# Patient Record
Sex: FEMALE | Race: BLACK OR AFRICAN AMERICAN | NOT HISPANIC OR LATINO | Employment: FULL TIME | URBAN - METROPOLITAN AREA
[De-identification: names, ages, dates, MRNs, and addresses within clinical notes are randomized per-mention and may not be internally consistent; named-entity substitution may affect disease eponyms.]

---

## 2017-05-26 ENCOUNTER — HOSPITAL ENCOUNTER (EMERGENCY)
Facility: HOSPITAL | Age: 32
Discharge: HOME | End: 2017-05-27
Payer: MEDICAID

## 2017-05-26 DIAGNOSIS — T19.2XXA VAGINAL FOREIGN BODY, INITIAL ENCOUNTER: Primary | ICD-10-CM

## 2017-05-26 LAB
ALBUMIN SERPL BCP-MCNC: 3.9 G/DL
ALP SERPL-CCNC: 51 U/L
ALT SERPL W/O P-5'-P-CCNC: 36 U/L
ANION GAP SERPL CALC-SCNC: 5 MMOL/L
AST SERPL-CCNC: 46 U/L
B-HCG UR QL: NEGATIVE
BASOPHILS # BLD AUTO: 0.04 K/UL
BASOPHILS NFR BLD: 0.6 %
BILIRUB SERPL-MCNC: 0.3 MG/DL
BILIRUB UR QL STRIP: NEGATIVE
BUN SERPL-MCNC: 11 MG/DL
CALCIUM SERPL-MCNC: 8.9 MG/DL
CHLORIDE SERPL-SCNC: 109 MMOL/L
CLARITY UR: CLEAR
CO2 SERPL-SCNC: 25 MMOL/L
COLOR UR: YELLOW
CREAT SERPL-MCNC: 0.9 MG/DL
DIFFERENTIAL METHOD: NORMAL
EOSINOPHIL # BLD AUTO: 0.2 K/UL
EOSINOPHIL NFR BLD: 2.4 %
ERYTHROCYTE [DISTWIDTH] IN BLOOD BY AUTOMATED COUNT: 12.7 %
EST. GFR  (AFRICAN AMERICAN): >60 ML/MIN/1.73 M^2
EST. GFR  (NON AFRICAN AMERICAN): >60 ML/MIN/1.73 M^2
GLUCOSE SERPL-MCNC: 82 MG/DL
GLUCOSE UR QL STRIP: NEGATIVE
HCT VFR BLD AUTO: 37.1 %
HGB BLD-MCNC: 12.9 G/DL
HGB UR QL STRIP: NEGATIVE
KETONES UR QL STRIP: NEGATIVE
LEUKOCYTE ESTERASE UR QL STRIP: NEGATIVE
LYMPHOCYTES # BLD AUTO: 2.9 K/UL
LYMPHOCYTES NFR BLD: 43.1 %
MCH RBC QN AUTO: 30 PG
MCHC RBC AUTO-ENTMCNC: 34.8 %
MCV RBC AUTO: 86 FL
MONOCYTES # BLD AUTO: 0.7 K/UL
MONOCYTES NFR BLD: 9.7 %
NEUTROPHILS # BLD AUTO: 3 K/UL
NEUTROPHILS NFR BLD: 44.2 %
NITRITE UR QL STRIP: NEGATIVE
PH UR STRIP: 6 [PH] (ref 5–8)
PLATELET # BLD AUTO: 259 K/UL
PMV BLD AUTO: 9.3 FL
POTASSIUM SERPL-SCNC: 3.6 MMOL/L
PROT SERPL-MCNC: 6.8 G/DL
PROT UR QL STRIP: NEGATIVE
RBC # BLD AUTO: 4.3 M/UL
SODIUM SERPL-SCNC: 139 MMOL/L
SP GR UR STRIP: 1.01 (ref 1–1.03)
URN SPEC COLLECT METH UR: NORMAL
UROBILINOGEN UR STRIP-ACNC: NEGATIVE EU/DL
WBC # BLD AUTO: 6.71 K/UL

## 2017-05-26 PROCEDURE — 81003 URINALYSIS AUTO W/O SCOPE: CPT

## 2017-05-26 PROCEDURE — 85025 COMPLETE CBC W/AUTO DIFF WBC: CPT

## 2017-05-26 PROCEDURE — 80053 COMPREHEN METABOLIC PANEL: CPT

## 2017-05-26 PROCEDURE — 99284 EMERGENCY DEPT VISIT MOD MDM: CPT | Mod: 27

## 2017-05-26 PROCEDURE — 36415 COLL VENOUS BLD VENIPUNCTURE: CPT

## 2017-05-26 PROCEDURE — 81025 URINE PREGNANCY TEST: CPT

## 2017-05-27 VITALS
TEMPERATURE: 98 F | BODY MASS INDEX: 32.44 KG/M2 | WEIGHT: 190 LBS | OXYGEN SATURATION: 100 % | SYSTOLIC BLOOD PRESSURE: 134 MMHG | HEART RATE: 62 BPM | HEIGHT: 64 IN | DIASTOLIC BLOOD PRESSURE: 78 MMHG | RESPIRATION RATE: 20 BRPM

## 2017-05-27 NOTE — ED NOTES
"Pt reports hx of abnormal vaginal bleeding.  Pt states +irregular periods.  States bleeding began Tuesday while pt was at work.  Pt reports 1 tampon per hour with quarter sized blood clots. Denies chest pain, weakness, sob, dizziness .  Pt also reports 'white, thick vaginal d/c x few weeks".  Denies vaginal itching.   "

## 2017-05-27 NOTE — ED PROVIDER NOTES
SCRIBE #1 NOTE: I, Yves Kumar, am scribing for, and in the presence of, Krishan Florence NP. I have scribed the entire note.      History      Chief Complaint   Patient presents with    Vaginal Bleeding     vaginal bleeding for a few days with abdominal pain       Review of patient's allergies indicates:  No Known Allergies     HPI   HPI    5/26/2017, 10:41 PM   History obtained from the patient      History of Present Illness: Harleen Hernandez is a 31 y.o. female patient who presents to the Emergency Department for vaginal bleeding, ongoing for 4 months. Pt reports she had initially presented to OB-GYN in February of this year who stated that '[she] was bleeding from her tubes'. Pt does have a SHx of Tubal Ligation. The bleeding is intermittent and moderate in severity. No modifying factors reported. Pt states her last menstrual cycle was two months ago. Pt also c/o lower abdominal pain, defined as cramping. Patient denies any headache, dizziness, light-headedness, CP, SOB, N/V/D or any other sx at this time. No further complaints or concerns at this time.     Arrival mode: Personal vehicle    PCP: Jagdish Ace MD       Past Medical History:  History reviewed. No pertinent past medical history.    Past Surgical History:  History reviewed. No pertinent surgical history.      Family History:  History reviewed. No pertinent family history.    Social History:  Social History     Social History Main Topics    Smoking status: Not on file    Smokeless tobacco: Not on file    Alcohol use Not on file    Drug use: Unknown    Sexual activity: Not on file       ROS   Review of Systems   Constitutional: Negative for chills and fever.   HENT: Negative for sore throat.    Respiratory: Negative for shortness of breath.    Cardiovascular: Negative for chest pain.   Gastrointestinal: Positive for abdominal pain. Negative for diarrhea, nausea and vomiting.   Genitourinary: Positive for vaginal bleeding. Negative for  dysuria.   Musculoskeletal: Negative for back pain.   Skin: Negative for rash.   Neurological: Negative for dizziness, weakness, light-headedness and headaches.   Hematological: Does not bruise/bleed easily.       Physical Exam      Initial Vitals [05/26/17 2202]   BP Pulse Resp Temp SpO2   (!) 140/81 60 18 97.4 °F (36.3 °C) 99 %      Physical Exam   Genitourinary: Cervix exhibits no motion tenderness, no discharge and no friability. Right adnexum displays no tenderness. Left adnexum displays no tenderness. No bleeding in the vagina.   There is a foreign body in the vagina.   Genitourinary Comments: Upon vaginal exam retained tampon was found and removed. Patient tolerated exam well. Chaperone was present for exam.      Nursing Notes and Vital Signs Reviewed.  Constitutional: Patient is in no acute distress. Well-developed and well-nourished.  Head: Atraumatic. Normocephalic.  Eyes: PERRL. EOM intact. Conjunctivae are not pale. No scleral icterus.  ENT: Mucous membranes are moist. Oropharynx is clear and symmetric.    Neck: Supple. Full ROM. No lymphadenopathy.  Cardiovascular: Regular rate. Regular rhythm. No murmurs, rubs, or gallops. Distal pulses are 2+ and symmetric.  Pulmonary/Chest: No respiratory distress. Clear to auscultation bilaterally. No wheezing, rales, or rhonchi.  Abdominal: Soft and non-distended.  There is no tenderness.  No rebound, guarding, or rigidity. Good bowel sounds.  Genitourinary: No CVA tenderness  Musculoskeletal: Moves all extremities. No obvious deformities. No edema. No calf tenderness.  Skin: Warm and dry.  Neurological:  Alert, awake, and appropriate.  Normal speech.  No acute focal neurological deficits are appreciated.  Psychiatric: Normal affect. Good eye contact. Appropriate in content.    ED Course    Procedures  ED Vital Signs:  Vitals:    05/26/17 2202   BP: (!) 140/81   Pulse: 60   Resp: 18   Temp: 97.4 °F (36.3 °C)   TempSrc: Oral   SpO2: 99%   Weight: 86.2 kg (190 lb)  "  Height: 5' 4" (1.626 m)       Abnormal Lab Results:  Labs Reviewed   COMPREHENSIVE METABOLIC PANEL - Abnormal; Notable for the following:        Result Value    Alkaline Phosphatase 51 (*)     AST 46 (*)     Anion Gap 5 (*)     All other components within normal limits   CBC W/ AUTO DIFFERENTIAL   PREGNANCY TEST, URINE RAPID   URINALYSIS        All Lab Results:  Results for orders placed or performed during the hospital encounter of 05/26/17   CBC W/ AUTO DIFFERENTIAL   Result Value Ref Range    WBC 6.71 3.90 - 12.70 K/uL    RBC 4.30 4.00 - 5.40 M/uL    Hemoglobin 12.9 12.0 - 16.0 g/dL    Hematocrit 37.1 37.0 - 48.5 %    MCV 86 82 - 98 fL    MCH 30.0 27.0 - 31.0 pg    MCHC 34.8 32.0 - 36.0 %    RDW 12.7 11.5 - 14.5 %    Platelets 259 150 - 350 K/uL    MPV 9.3 9.2 - 12.9 fL    Gran # 3.0 1.8 - 7.7 K/uL    Lymph # 2.9 1.0 - 4.8 K/uL    Mono # 0.7 0.3 - 1.0 K/uL    Eos # 0.2 0.0 - 0.5 K/uL    Baso # 0.04 0.00 - 0.20 K/uL    Gran% 44.2 38.0 - 73.0 %    Lymph% 43.1 18.0 - 48.0 %    Mono% 9.7 4.0 - 15.0 %    Eosinophil% 2.4 0.0 - 8.0 %    Basophil% 0.6 0.0 - 1.9 %    Differential Method Automated    Comp. Metabolic Panel   Result Value Ref Range    Sodium 139 136 - 145 mmol/L    Potassium 3.6 3.5 - 5.1 mmol/L    Chloride 109 95 - 110 mmol/L    CO2 25 23 - 29 mmol/L    Glucose 82 70 - 110 mg/dL    BUN, Bld 11 6 - 20 mg/dL    Creatinine 0.9 0.5 - 1.4 mg/dL    Calcium 8.9 8.7 - 10.5 mg/dL    Total Protein 6.8 6.0 - 8.4 g/dL    Albumin 3.9 3.5 - 5.2 g/dL    Total Bilirubin 0.3 0.1 - 1.0 mg/dL    Alkaline Phosphatase 51 (L) 55 - 135 U/L    AST 46 (H) 10 - 40 U/L    ALT 36 10 - 44 U/L    Anion Gap 5 (L) 8 - 16 mmol/L    eGFR if African American >60 >60 mL/min/1.73 m^2    eGFR if non African American >60 >60 mL/min/1.73 m^2   Pregnancy, urine rapid   Result Value Ref Range    Preg Test, Ur Negative    Urinalysis   Result Value Ref Range    Specimen UA Urine, Clean Catch     Color, UA Yellow Yellow, Straw, Karlene    Appearance, " UA Clear Clear    pH, UA 6.0 5.0 - 8.0    Specific Gravity, UA 1.015 1.005 - 1.030    Protein, UA Negative Negative    Glucose, UA Negative Negative    Ketones, UA Negative Negative    Bilirubin (UA) Negative Negative    Occult Blood UA Negative Negative    Nitrite, UA Negative Negative    Urobilinogen, UA Negative <2.0 EU/dL    Leukocytes, UA Negative Negative         Imaging Results:  Imaging Results    None                 The Emergency Provider reviewed the vital signs and test results, which are outlined above.    ED Discussion     12:24 AM RE-EVALUATION & DISPOSITION:   Reassessment at the time of disposition demonstrates that the patient is resting comfortably in no acute distress.  She has remained hemodynamically stable throughout the entire ED visit and is without objective evidence for acute process requiring urgent intervention or hospitalization. I discussed test results and provided counseling to patient with regard to condition, the treatment plan, specific conditions for return, and the importance of follow up.  Answered questions at this time. The patient is stable for discharge.     ED Medication(s):  Medications - No data to display    New Prescriptions    No medications on file       Follow-up Information     Jagdish Ace MD. Schedule an appointment as soon as possible for a visit in 2 days.    Specialty:  Internal Medicine  Why:  Return to ER for any new or worse symptoms  Contact information:  [address] [facility]  [facility]  [facility] [address] [phone]                     Medical Decision Making              Scribe Attestation:   Scribe #1: I performed the above scribed service and the documentation accurately describes the services I performed. I attest to the accuracy of the note.    Attending:   Physician Attestation Statement for Scribe #1: I, Krishan Florence NP, personally performed the services described in this documentation, as scribed by Yves Kumar in  my presence, and it is both accurate and complete.          Clinical Impression       ICD-10-CM ICD-9-CM   1. Vaginal foreign body, initial encounterAcute T19.2XXA 939.2     E915       Disposition:   Disposition: Discharged  Condition: Stable         Krishan Florence NP  05/27/17 0024

## 2018-06-17 ENCOUNTER — HOSPITAL ENCOUNTER (EMERGENCY)
Facility: HOSPITAL | Age: 33
Discharge: HOME | End: 2018-06-17
Attending: EMERGENCY MEDICINE
Payer: MEDICAID

## 2018-06-17 VITALS
WEIGHT: 179.88 LBS | TEMPERATURE: 99 F | DIASTOLIC BLOOD PRESSURE: 70 MMHG | OXYGEN SATURATION: 98 % | BODY MASS INDEX: 35.31 KG/M2 | SYSTOLIC BLOOD PRESSURE: 123 MMHG | RESPIRATION RATE: 18 BRPM | HEIGHT: 60 IN | HEART RATE: 101 BPM

## 2018-06-17 DIAGNOSIS — R91.8 PULMONARY INFILTRATE IN RIGHT LUNG ON CHEST X-RAY: ICD-10-CM

## 2018-06-17 DIAGNOSIS — R05.9 COUGH: ICD-10-CM

## 2018-06-17 DIAGNOSIS — J40 BRONCHITIS: ICD-10-CM

## 2018-06-17 DIAGNOSIS — J45.909 MODERATE ASTHMA WITHOUT COMPLICATION, UNSPECIFIED WHETHER PERSISTENT: Primary | ICD-10-CM

## 2018-06-17 LAB
B-HCG UR QL: NEGATIVE
BILIRUB UR QL STRIP: NEGATIVE
CLARITY UR: CLEAR
COLOR UR: YELLOW
GLUCOSE UR QL STRIP: NEGATIVE
HGB UR QL STRIP: NEGATIVE
KETONES UR QL STRIP: NEGATIVE
LEUKOCYTE ESTERASE UR QL STRIP: ABNORMAL
MICROSCOPIC COMMENT: NORMAL
NITRITE UR QL STRIP: NEGATIVE
PH UR STRIP: 6 [PH] (ref 5–8)
PROT UR QL STRIP: NEGATIVE
RBC #/AREA URNS HPF: 1 /HPF (ref 0–4)
SP GR UR STRIP: 1.01 (ref 1–1.03)
SQUAMOUS #/AREA URNS HPF: 3 /HPF
URN SPEC COLLECT METH UR: ABNORMAL
UROBILINOGEN UR STRIP-ACNC: NEGATIVE EU/DL
WBC #/AREA URNS HPF: 5 /HPF (ref 0–5)

## 2018-06-17 PROCEDURE — 81000 URINALYSIS NONAUTO W/SCOPE: CPT

## 2018-06-17 PROCEDURE — 99284 EMERGENCY DEPT VISIT MOD MDM: CPT | Mod: 25

## 2018-06-17 PROCEDURE — 94640 AIRWAY INHALATION TREATMENT: CPT | Mod: 76

## 2018-06-17 PROCEDURE — 81025 URINE PREGNANCY TEST: CPT

## 2018-06-17 PROCEDURE — 25000242 PHARM REV CODE 250 ALT 637 W/ HCPCS: Performed by: EMERGENCY MEDICINE

## 2018-06-17 PROCEDURE — 25000003 PHARM REV CODE 250: Performed by: EMERGENCY MEDICINE

## 2018-06-17 RX ORDER — ONDANSETRON 4 MG/1
4 TABLET, ORALLY DISINTEGRATING ORAL EVERY 8 HOURS PRN
Qty: 15 TABLET | Refills: 0 | Status: SHIPPED | OUTPATIENT
Start: 2018-06-17

## 2018-06-17 RX ORDER — LEVOFLOXACIN 500 MG/1
500 TABLET, FILM COATED ORAL DAILY
Qty: 7 TABLET | Refills: 0 | Status: SHIPPED | OUTPATIENT
Start: 2018-06-17 | End: 2018-06-24

## 2018-06-17 RX ORDER — IPRATROPIUM BROMIDE AND ALBUTEROL SULFATE 2.5; .5 MG/3ML; MG/3ML
3 SOLUTION RESPIRATORY (INHALATION)
Status: COMPLETED | OUTPATIENT
Start: 2018-06-17 | End: 2018-06-17

## 2018-06-17 RX ORDER — ONDANSETRON 4 MG/1
4 TABLET, ORALLY DISINTEGRATING ORAL
Status: COMPLETED | OUTPATIENT
Start: 2018-06-17 | End: 2018-06-17

## 2018-06-17 RX ORDER — PROMETHAZINE HYDROCHLORIDE AND DEXTROMETHORPHAN HYDROBROMIDE 6.25; 15 MG/5ML; MG/5ML
5 SYRUP ORAL EVERY 6 HOURS PRN
Qty: 180 ML | Refills: 0 | Status: SHIPPED | OUTPATIENT
Start: 2018-06-17 | End: 2018-06-27

## 2018-06-17 RX ORDER — ALBUTEROL SULFATE 90 UG/1
2 AEROSOL, METERED RESPIRATORY (INHALATION) EVERY 6 HOURS PRN
Qty: 1 INHALER | Refills: 3 | Status: SHIPPED | OUTPATIENT
Start: 2018-06-17 | End: 2019-06-17

## 2018-06-17 RX ADMIN — IPRATROPIUM BROMIDE AND ALBUTEROL SULFATE 3 ML: .5; 3 SOLUTION RESPIRATORY (INHALATION) at 05:06

## 2018-06-17 RX ADMIN — ONDANSETRON 4 MG: 4 TABLET, ORALLY DISINTEGRATING ORAL at 05:06

## 2018-06-17 NOTE — ED PROVIDER NOTES
"SCRIBE #1 NOTE: I, Mirella Tommykamalen, am scribing for, and in the presence of, Colin Dent Jr., MD. I have scribed the entire note.      History      Chief Complaint   Patient presents with    General Illness     Pt states, "I have a chest cold and I threw up a few times since yesterday."       Review of patient's allergies indicates:  No Known Allergies     HPI   HPI    6/17/2018, 5:28 PM   History obtained from the patient      History of Present Illness: Harleen Deshpande is a 32 y.o. female patient with a hx of asthma who presents to the Emergency Department for cough which onset gradually a few days ago. Symptoms are intermittent and moderate in severity.  No mitigating or exacerbating factors reported. Associated sxs include congestion and n/v. Patient denies any sore throat, fever, chills, ear pain, constipation, hematochezia, dysuria, hematuria, urinary frequency, diarrhea, abd pain, CP, SOB, and all other sxs at this time. No further complaints or concerns at this time.         Arrival mode: Personal vehicle     PCP: Primary Doctor No       Past Medical History:  Past medical history reviewed not relevant      Past Surgical History:  Past surgical history reviewed not relevant      Family History:  Family history reviewed not relevant      Social History:  Social History    Social History Main Topics    Social History Main Topics    Smoking status: Unknown if ever smoked    Smokeless tobacco: Unknown if ever used    Alcohol Use: Unknown drinking history    Drug Use: Unknown if ever used    Sexual Activity: Unknown       ROS   Review of Systems   Constitutional: Negative for chills and fever.   HENT: Positive for congestion. Negative for ear pain and sore throat.    Respiratory: Positive for cough. Negative for shortness of breath.    Cardiovascular: Negative for chest pain.   Gastrointestinal: Positive for nausea and vomiting. Negative for abdominal pain, blood in stool, constipation and diarrhea. "   Genitourinary: Negative for dysuria, frequency and hematuria.   Musculoskeletal: Negative for back pain.   Skin: Negative for rash.   Neurological: Negative for weakness.   Hematological: Does not bruise/bleed easily.   All other systems reviewed and are negative.      Physical Exam      Initial Vitals [06/17/18 1725]   BP Pulse Resp Temp SpO2   123/70 99 20 99.3 °F (37.4 °C) 96 %      MAP       --          Physical Exam  Nursing Notes and Vital Signs Reviewed.  Constitutional: Patient is in no acute distress. Well-developed and well-nourished.  Head: Atraumatic. Normocephalic.  Eyes: PERRL. EOM intact. Conjunctivae are not pale. No scleral icterus.  ENT: Mucous membranes are moist. Oropharynx is clear and symmetric. Postnasal drip  Neck: Supple. Full ROM. No lymphadenopathy.  Cardiovascular: Regular rate. Regular rhythm. No murmurs, rubs, or gallops. Distal pulses are 2+ and symmetric.  Pulmonary/Chest: No respiratory distress. Congested. Wheezing bilaterally. No rales.  Abdominal: Soft and non-distended.  There is no tenderness.  No rebound, guarding, or rigidity.   Musculoskeletal: Moves all extremities. No obvious deformities. No edema.   Skin: Warm and dry.  Neurological:  Alert, awake, and appropriate.  Normal speech.  No acute focal neurological deficits are appreciated.  Psychiatric: Normal affect. Good eye contact. Appropriate in content.    ED Course    Procedures  ED Vital Signs:  Vitals:    06/17/18 1725 06/17/18 1742 06/17/18 1748 06/17/18 1753   BP: 123/70      Pulse: 99 97 95 101   Resp: 20 17 18 18   Temp: 99.3 °F (37.4 °C)      TempSrc: Oral      SpO2: 96% 96% 100% 96%   Weight: 81.6 kg (179 lb 14.3 oz)      Height: 5' (1.524 m)          Abnormal Lab Results:  Labs Reviewed   URINALYSIS - Abnormal; Notable for the following:        Result Value    Leukocytes, UA Trace (*)     All other components within normal limits   PREGNANCY TEST, URINE RAPID   URINALYSIS MICROSCOPIC        All Lab  Results:  Results for orders placed or performed during the hospital encounter of 06/17/18   Urinalysis   Result Value Ref Range    Specimen UA Urine, Clean Catch     Color, UA Yellow Yellow, Straw, Karlene    Appearance, UA Clear Clear    pH, UA 6.0 5.0 - 8.0    Specific Gravity, UA 1.010 1.005 - 1.030    Protein, UA Negative Negative    Glucose, UA Negative Negative    Ketones, UA Negative Negative    Bilirubin (UA) Negative Negative    Occult Blood UA Negative Negative    Nitrite, UA Negative Negative    Urobilinogen, UA Negative <2.0 EU/dL    Leukocytes, UA Trace (A) Negative   Pregnancy, urine rapid (UPT)   Result Value Ref Range    Preg Test, Ur Negative    Urinalysis Microscopic   Result Value Ref Range    RBC, UA 1 0 - 4 /hpf    WBC, UA 5 0 - 5 /hpf    Squam Epithel, UA 3 /hpf    Microscopic Comment SEE COMMENT          Imaging Results:  Imaging Results          X-Ray Chest PA And Lateral (Final result)  Result time 06/17/18 18:15:45    Final result by Doug Smyth MD (06/17/18 18:15:45)                 Impression:      No acute findings.      Electronically signed by: Doug Smyth MD  Date:    06/17/2018  Time:    18:15             Narrative:    EXAMINATION:  XR CHEST PA AND LATERAL    CLINICAL HISTORY:  Cough    COMPARISON:  None    FINDINGS:  Mild atelectasis or infiltrate identified left lung base.  The right lung is clear.  Heart size within normal limits.No significant bony findings.                                        The Emergency Provider reviewed the vital signs and test results, which are outlined above.    ED Discussion       6:22 PM: Reassessed pt at this time. Pt is awake, alert, and in NAD at this time. Pt's lungs are clear after receiving breathing treatments. Discussed with pt all pertinent ED information and results. Discussed pt dx and plan of tx. Gave pt all f/u and return to the ED instructions. All questions and concerns were addressed at this time. Pt expresses  understanding of information and instructions, and is comfortable with plan to discharge. Pt is stable for discharge.      I discussed with patient and/or family/caretaker that evaluation in the ED does not suggest any emergent or life threatening medical conditions requiring immediate intervention beyond what was provided in the ED, and I believe patient is safe for discharge.  Regardless, an unremarkable evaluation in the ED does not preclude the development or presence of a serious of life threatening condition. As such, patient was instructed to return immediately for any worsening or change in current symptoms.      ED Medication(s):  Medications   albuterol-ipratropium 2.5 mg-0.5 mg/3 mL nebulizer solution 3 mL (3 mLs Nebulization Given by Other 6/17/18 6553)   ondansetron disintegrating tablet 4 mg (4 mg Oral Given 6/17/18 1745)       New Prescriptions    ALBUTEROL 90 MCG/ACTUATION INHALER    Inhale 2 puffs into the lungs every 6 (six) hours as needed for Wheezing or Shortness of Breath. Rescue    LEVOFLOXACIN (LEVAQUIN) 500 MG TABLET    Take 1 tablet (500 mg total) by mouth once daily.    ONDANSETRON (ZOFRAN-ODT) 4 MG TBDL    Take 1 tablet (4 mg total) by mouth every 8 (eight) hours as needed (prn nausea/vomiting).    PROMETHAZINE-DEXTROMETHORPHAN (PROMETHAZINE-DM) 6.25-15 MG/5 ML SYRP    Take 5 mLs by mouth every 6 (six) hours as needed. Prn nausea/coughing       Follow-up Information     Call  [facility].    Why:  As needed to schedule appt for recheck this week  Contact information:  [address] [facility] [address] [phone]             [facility]    Specialty:  Emergency Medicine  Why:  As needed  Contact information:  [address] [facility] [address] [phone]                   Medical Decision Making    Medical Decision Making:   Clinical Tests:   Lab Tests: Ordered and Reviewed  Radiological Study: Ordered and Reviewed            Scribe Attestation:   Scribe #1: I performed the above scribed service and the documentation accurately describes the services I performed. I attest to the accuracy of the note.    Attending:   Physician Attestation Statement for Scribe #1: I, Colin Detn Jr., MD, personally performed the services described in this documentation, as scribed by Mirella Trevino, in my presence, and it is both accurate and complete.          Clinical Impression       ICD-10-CM ICD-9-CM   1. Moderate asthma without complication, unspecified whether persistent J45.909 493.90   2. Cough R05 786.2   3. Bronchitis J40 490   4. Pulmonary infiltrate in right lung on chest x-ray R91.8 793.19       Disposition:   Disposition: Discharged  Condition: Stable         Colin Dent Jr., MD  06/17/18 7859

## 2018-06-19 ENCOUNTER — HOSPITAL ENCOUNTER (EMERGENCY)
Facility: HOSPITAL | Age: 33
Discharge: HOME | End: 2018-06-19
Payer: MEDICAID

## 2018-06-19 VITALS
RESPIRATION RATE: 20 BRPM | SYSTOLIC BLOOD PRESSURE: 120 MMHG | HEIGHT: 60 IN | TEMPERATURE: 97 F | DIASTOLIC BLOOD PRESSURE: 64 MMHG | HEART RATE: 78 BPM | OXYGEN SATURATION: 97 % | BODY MASS INDEX: 35.53 KG/M2 | WEIGHT: 181 LBS

## 2018-06-19 DIAGNOSIS — R53.83 FATIGUE, UNSPECIFIED TYPE: ICD-10-CM

## 2018-06-19 DIAGNOSIS — R06.2 WHEEZING: Primary | ICD-10-CM

## 2018-06-19 DIAGNOSIS — R06.02 SHORTNESS OF BREATH: ICD-10-CM

## 2018-06-19 PROCEDURE — 93010 ELECTROCARDIOGRAM REPORT: CPT | Mod: ,,, | Performed by: INTERNAL MEDICINE

## 2018-06-19 PROCEDURE — 93005 ELECTROCARDIOGRAM TRACING: CPT

## 2018-06-19 PROCEDURE — 99284 EMERGENCY DEPT VISIT MOD MDM: CPT | Mod: 25

## 2018-06-19 PROCEDURE — 94640 AIRWAY INHALATION TREATMENT: CPT

## 2018-06-19 PROCEDURE — 25000242 PHARM REV CODE 250 ALT 637 W/ HCPCS: Performed by: REGISTERED NURSE

## 2018-06-19 RX ORDER — IPRATROPIUM BROMIDE AND ALBUTEROL SULFATE 2.5; .5 MG/3ML; MG/3ML
3 SOLUTION RESPIRATORY (INHALATION)
Status: COMPLETED | OUTPATIENT
Start: 2018-06-19 | End: 2018-06-19

## 2018-06-19 RX ADMIN — IPRATROPIUM BROMIDE AND ALBUTEROL SULFATE 3 ML: .5; 3 SOLUTION RESPIRATORY (INHALATION) at 03:06

## 2018-06-19 NOTE — ED PROVIDER NOTES
History      Chief Complaint   Patient presents with    URI     URI since Friday; seen at [facility]       Review of patient's allergies indicates:  No Known Allergies     HPI   HPI    6/19/2018, 2:34 PM   History obtained from the patient      History of Present Illness: Harleen Deshpande is a 32 y.o. female patient who presents to the Emergency Department for cough, SOB and fatigue which onset gradually 5 days ago. Pt was seen here 2 days ago and diagnosed with infiltrate to R lower lung. She began taking Levaquin yesterday but states that she still feels bad . Symptoms are constant and moderate in severity. No mitigating or exacerbating factors reported. Patient denies any fever, NVD, CP, and all other sxs at this time. No further complaints or concerns at this time.         Arrival mode: Personal vehicle      PCP: Maria Teresa Mendieta MD       Past Medical History:  Past Medical History:   Diagnosis Date    Asthma        Past Surgical History:  History reviewed. No pertinent surgical history.      Family History:  History reviewed. No pertinent family history.    Social History:  Social History     Social History Main Topics    Smoking status: Not on file    Smokeless tobacco: Not on file    Alcohol use No    Drug use: No    Sexual activity: Not on file       ROS   Review of Systems   Constitutional: Positive for fatigue. Negative for fever.   HENT: Negative for sore throat.    Respiratory: Positive for cough and shortness of breath.    Cardiovascular: Negative for chest pain.   Gastrointestinal: Negative for nausea.   Genitourinary: Negative for dysuria.   Musculoskeletal: Negative for back pain.   Skin: Negative for rash.   Neurological: Negative for weakness.   Hematological: Does not bruise/bleed easily.   All other systems reviewed and are negative.      Physical Exam      Initial Vitals [06/19/18 1430]   BP Pulse Resp Temp SpO2   118/63 104 20 96.1 °F (35.6 °C) 96 %      MAP       --          Physical  Exam  Nursing Notes and Vital Signs Reviewed.  Constitutional: Patient is in no acute distress. Well-developed and well-nourished.  Head: Atraumatic. Normocephalic.  Eyes: PERRL. EOM intact. Conjunctivae are not pale. No scleral icterus.  ENT: Mucous membranes are moist. Oropharynx is clear and symmetric.    Neck: Supple. Full ROM. No lymphadenopathy.  Cardiovascular: Tachycardic. Regular rhythm. No murmurs, rubs, or gallops. Distal pulses are 2+ and symmetric.  Pulmonary/Chest: No respiratory distress. Mild expiratory wheezes.  Abdominal: Soft and non-distended.  There is no tenderness.  No rebound, guarding, or rigidity. Good bowel sounds.  Genitourinary: No CVA tenderness  Musculoskeletal: Moves all extremities. No obvious deformities. No edema. No calf tenderness.  Skin: Warm and dry.  Neurological:  Alert, awake, and appropriate.  Normal speech.  No acute focal neurological deficits are appreciated.  Psychiatric: Normal affect. Good eye contact. Appropriate in content.    ED Course    Procedures  ED Vital Signs:  Vitals:    06/19/18 1430 06/19/18 1500 06/19/18 1509 06/19/18 1552   BP: 118/63   120/64   Pulse: 104 81 76 78   Resp: 20  18 20   Temp: 96.1 °F (35.6 °C)   97 °F (36.1 °C)   TempSrc: Oral   Oral   SpO2: 96%  96% 97%   Weight: 82.1 kg (181 lb)      Height: 5' (1.524 m)          Abnormal Lab Results:  Labs Reviewed - No data to display     All Lab Results:      Imaging Results:  Imaging Results          X-Ray Chest 1 View (Final result)  Result time 06/19/18 15:30:22    Final result by NEDA Wang Sr., MD (06/19/18 15:30:22)                 Impression:      Normal study.      Electronically signed by: Doug Wang MD  Date:    06/19/2018  Time:    15:30             Narrative:    EXAMINATION:  XR CHEST 1 VIEW    CLINICAL HISTORY:  Shortness of breath    COMPARISON:  06/17/2018    FINDINGS:  The size of the heart is normal. The lungs are clear. There is no pneumothorax.  The costophrenic angles  are sharp.                               The EKG was ordered, reviewed, and independently interpreted by the ED provider.  Interpretation time: 1458  Rate: 81 BPM  Rhythm: normal sinus rhythm  Interpretation: Possible left atrial enlargement. Borderline ECG. No STEMI.         The Emergency Provider reviewed the vital signs and test results, which are outlined above.    ED Discussion     3:49 PM: Reassessed pt at this time. Discussed with pt all pertinent ED information and results. Discussed pt dx and plan of tx. Gave pt all f/u and return to the ED instructions. All questions and concerns were addressed at this time. Pt expresses understanding of information and instructions, and is comfortable with plan to discharge. Pt is stable for discharge.  Advised patient to continue with abx and increased fluid intake and get plenty of rest.    I discussed with patient and/or family/caretaker that evaluation in the ED does not suggest any emergent or life threatening medical conditions requiring immediate intervention beyond what was provided in the ED, and I believe patient is safe for discharge.  Regardless, an unremarkable evaluation in the ED does not preclude the development or presence of a serious of life threatening condition. As such, patient was instructed to return immediately for any worsening or change in current symptoms.      ED Medication(s):  Medications   albuterol-ipratropium 2.5 mg-0.5 mg/3 mL nebulizer solution 3 mL (3 mLs Nebulization Given 6/19/18 1509)       Discharge Medication List as of 6/19/2018  3:48 PM          Follow-up Information     Primary Doctor No In 3 days.           [facility].    Specialty:  Emergency Medicine  Why:  If symptoms worsen  Contact information:  [address]  [facility] [address] [phone]                   Medical Decision Making    Medical Decision Making:   Clinical Tests:   Radiological Study: Ordered and  Reviewed  Medical Tests: Ordered and Reviewed                Clinical Impression       ICD-10-CM ICD-9-CM   1. Wheezing R06.2 786.07   2. Shortness of breath R06.02 786.05   3. Fatigue, unspecified type R53.83 780.79       Disposition:   Disposition: Discharged  Condition: Stable         Colin Baum Jr., P  06/20/18 1512

## 2018-07-25 ENCOUNTER — HOSPITAL ENCOUNTER (EMERGENCY)
Facility: HOSPITAL | Age: 33
Discharge: HOME | End: 2018-07-25
Attending: EMERGENCY MEDICINE
Payer: MEDICAID

## 2018-07-25 VITALS
RESPIRATION RATE: 18 BRPM | HEART RATE: 92 BPM | HEIGHT: 60 IN | SYSTOLIC BLOOD PRESSURE: 142 MMHG | TEMPERATURE: 99 F | DIASTOLIC BLOOD PRESSURE: 67 MMHG | OXYGEN SATURATION: 98 %
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
## 2018-07-25 DIAGNOSIS — S40.011A CONTUSION OF RIGHT SHOULDER, INITIAL ENCOUNTER: ICD-10-CM

## 2018-07-25 DIAGNOSIS — S76.012A HIP STRAIN, LEFT, INITIAL ENCOUNTER: Primary | ICD-10-CM

## 2018-07-25 PROCEDURE — 99283 EMERGENCY DEPT VISIT LOW MDM: CPT | Mod: 25

## 2018-07-25 RX ORDER — RISPERIDONE 0.25 MG/1
0.25 TABLET ORAL DAILY
COMMUNITY

## 2018-07-25 RX ORDER — NAPROXEN 375 MG/1
375 TABLET ORAL 2 TIMES DAILY WITH MEALS
Qty: 60 TABLET | Refills: 0 | Status: SHIPPED | OUTPATIENT
Start: 2018-07-25

## 2018-07-25 RX ORDER — CYCLOBENZAPRINE HCL 10 MG
10 TABLET ORAL 3 TIMES DAILY PRN
Qty: 15 TABLET | Refills: 0 | Status: SHIPPED | OUTPATIENT
Start: 2018-07-25 | End: 2018-07-30

## 2018-07-25 NOTE — ED PROVIDER NOTES
SCRIBE #1 NOTE: I, Corinne Mack, am scribing for, and in the presence of, Farrah Washington Do, MD. I have scribed the entire note.      History      Chief Complaint   Patient presents with    Hip Pain     left hip pain and right shoulder pain since this morning after slipping in tub        Review of patient's allergies indicates:   Allergen Reactions    Strawberries [strawberry] Rash     Peaches, grapes, bananas, cabbage, greens, yams        HPI   HPI    2018, 8:19 AM   History obtained from the patient      History of Present Illness: Harleen Deshpande is a 32 y.o. female patient who presents to the Emergency Department for L hip pain which onset suddenly this morning. Pt reports she fell in the shower and landed on the edge of the tub. Symptoms are constant and moderate in severity. No mitigating or exacerbating factors reported. Associated sxs include R shoulder pain. Patient denies any fever, chills, head injury, CP, SOB, N/V, back pain, neck pain, LOC, HA, and all other sxs at this time. No prior Tx reported. No further complaints or concerns at this time.         Arrival mode: Personal vehicle    PCP: Maria Teresa Mendieta MD       Past Medical History:  Past Medical History:   Diagnosis Date    Asthma        Past Surgical History:  Past Surgical History:   Procedure Laterality Date     SECTION, CLASSIC           Family History:  History reviewed. No pertinent family history.    Social History:  Social History     Social History Main Topics    Smoking status: Current Every Day Smoker     Packs/day: 1.00     Years: 20.00     Types: Cigarettes    Smokeless tobacco: Not on file    Alcohol use No    Drug use: No    Sexual activity: Not on file       ROS   Review of Systems   Constitutional: Negative for chills and fever.   HENT: Negative for nosebleeds.         (-) head injury   Respiratory: Negative for cough and shortness of breath.    Cardiovascular: Negative for chest pain and leg swelling.    Gastrointestinal: Negative for abdominal pain, diarrhea, nausea and vomiting.   Musculoskeletal: Positive for arthralgias (L hip; R shoulder). Negative for back pain, neck pain and neck stiffness.   Skin: Negative for rash and wound.   Neurological: Negative for dizziness, light-headedness, numbness and headaches.        (-)LOC   All other systems reviewed and are negative.    Physical Exam      Initial Vitals [07/25/18 0732]   BP Pulse Resp Temp SpO2   (!) 142/67 92 18 98.7 °F (37.1 °C) 98 %      MAP       --          Physical Exam  Nursing Notes and Vital Signs Reviewed.  Constitutional: Patient is in no apparent distress. Well-developed and well-nourished.  Head: Atraumatic. Normocephalic.  Eyes: PERRL. EOM intact. Conjunctivae are not pale. No scleral icterus.  ENT: Mucous membranes are moist. Oropharynx is clear and symmetric.    Neck: Supple. Full ROM. No lymphadenopathy.  Cardiovascular: Regular rate. Regular rhythm. No murmurs, rubs, or gallops. Distal pulses are 2+ and symmetric.  Pulmonary/Chest: No respiratory distress. Clear to auscultation bilaterally. No wheezing or rales.  Abdominal: Soft and non-distended.  There is no tenderness.  No rebound, guarding, or rigidity. Obese.  Musculoskeletal: Moves all extremities. No obvious deformities. No edema. L hip muscle spasms. R shoulder tenderness to palpation.  Skin: Warm and dry.  Neurological:  Alert, awake, and appropriate.  Normal speech.  No acute focal neurological deficits are appreciated.  Psychiatric: Normal affect. Good eye contact. Appropriate in content.    ED Course    Procedures  ED Vital Signs:  Vitals:    07/25/18 0732   BP: (!) 142/67   Pulse: 92   Resp: 18   Temp: 98.7 °F (37.1 °C)   TempSrc: Oral   SpO2: 98%   Height: 5' (1.524 m)       Abnormal Lab Results:  Labs Reviewed   PREGNANCY TEST, URINE RAPID        All Lab Results:  None    Imaging Results:  Imaging Results          X-Ray Shoulder Trauma Right (Final result)  Result time  07/25/18 08:43:53    Final result by Naveed Ward MD (07/25/18 08:43:53)                 Impression:      No acute fracture or dislocation.      Electronically signed by: Naveed Ward MD  Date:    07/25/2018  Time:    08:43             Narrative:    EXAMINATION:  XR SHOULDER TRAUMA 3 VIEW RIGHT    CLINICAL HISTORY:  Unspecified fall, initial encounter    FINDINGS:  No evidence of acute fracture or dislocation.  Bony mineralization is normal.  Soft tissues are unremarkable. Visualized lung fields are clear.    No significant degenerative changes noted.                               X-Ray Hip 2 View Left (Final result)  Result time 07/25/18 08:44:09    Final result by Naveed Ward MD (07/25/18 08:44:09)                 Impression:      No acute fracture or dislocation.      Electronically signed by: Naveed Ward MD  Date:    07/25/2018  Time:    08:44             Narrative:    EXAMINATION:  XR HIP 2 VIEW LEFT    CLINICAL HISTORY:  XR HIP 2 VIEW LEFTUnspecified fall, initial encounter    FINDINGS:  Three views of the left hip were obtained.    No evidence of acute fracture or dislocation.  Bony mineralization is normal.  Soft tissues are unremarkable.                                          The Emergency Provider reviewed the vital signs and test results, which are outlined above.    ED Discussion     9:41 AM: Reassessed pt at this time. Pt is awake, alert, and in no distress. Discussed with pt all pertinent ED information and results. Discussed pt dx and plan of tx. Gave pt all f/u and return to the ED instructions. All questions and concerns were addressed at this time. Pt expresses understanding of information and instructions, and is comfortable with plan to discharge. Pt is stable for discharge.    I discussed with patient and/or family/caretaker that evaluation in the ED does not suggest any emergent or life threatening medical conditions requiring immediate intervention beyond what was provided in the  ED, and I believe patient is safe for discharge.  Regardless, an unremarkable evaluation in the ED does not preclude the development or presence of a serious of life threatening condition. As such, patient was instructed to return immediately for any worsening or change in current symptoms.    I discussed with patient and/or family/caretaker that negative X-ray does not rule out occult fracture or other soft tissue injury.  Persistent pain greater than 7-10 days or increased pain requires follow up, specifically with orthopedics.       ED Medication(s):  Medications - No data to display    New Prescriptions    CYCLOBENZAPRINE (FLEXERIL) 10 MG TABLET    Take 1 tablet (10 mg total) by mouth 3 (three) times daily as needed for Muscle spasms.    NAPROXEN (NAPROSYN) 375 MG TABLET    Take 1 tablet (375 mg total) by mouth 2 (two) times daily with meals.       Follow-up Information     Maria Teresa Mendieta MD In 2 days.    Specialty:  Cardiology  Contact information:  [address]   [address] [place] [id number] [phone]                     Medical Decision Making    Medical Decision Making:   Clinical Tests:   Radiological Study: Ordered and Reviewed           Scribe Attestation:   Scribe #1: I performed the above scribed service and the documentation accurately describes the services I performed. I attest to the accuracy of the note.    Attending:   Physician Attestation Statement for Scribe #1: I, Farrah Washington Do, MD, personally performed the services described in this documentation, as scribed by Corinne Mack, in my presence, and it is both accurate and complete.          Clinical Impression       ICD-10-CM ICD-9-CM   1. Hip strain, left, initial encounter S76.012A 843.9   2. Fall W19.XXXA E888.9   3. Contusion of right shoulder, initial encounter S40.011A 923.00       Disposition:   Disposition: Discharged  Condition: Stable           Farrah Washington Do, MD  07/25/18 4592